# Patient Record
Sex: MALE | Race: WHITE | NOT HISPANIC OR LATINO | URBAN - METROPOLITAN AREA
[De-identification: names, ages, dates, MRNs, and addresses within clinical notes are randomized per-mention and may not be internally consistent; named-entity substitution may affect disease eponyms.]

---

## 2017-07-17 PROBLEM — 236060008 SYMPTOMATIC DISORDERS OF THE GASTROINTESTINAL TRACT: Status: ACTIVE | Noted: 2017-07-17

## 2017-07-17 PROBLEM — 422587007 NAUSEA: Status: INACTIVE | Noted: 2017-07-17

## 2017-07-17 PROBLEM — 110483000 TOBACCO USE: Status: INACTIVE | Noted: 2017-07-17

## 2019-08-21 PROBLEM — 266435005 GASTRO-ESOPHAGEAL REFLUX DISEASE WITHOUT ESOPHAGITIS: Status: ACTIVE | Noted: 2019-08-21

## 2019-08-21 PROBLEM — 449681000124101 LONG-TERM CURRENT USE OF ANTIPLATELET DRUG: Status: ACTIVE | Noted: 2019-08-21

## 2019-08-21 PROBLEM — 238131007 OVERWEIGHT: Status: ACTIVE | Noted: 2019-08-21

## 2019-08-21 PROBLEM — 428283002 HISTORY OF POLYP OF COLON (SITUATION): Status: ACTIVE | Noted: 2019-08-21

## 2019-08-21 PROBLEM — 87522002 IRON DEFICIENCY ANEMIA: Status: ACTIVE | Noted: 2019-08-21

## 2019-08-21 PROBLEM — 56294008 NICOTINE DEPENDENCE: Status: ACTIVE | Noted: 2019-08-21

## 2022-04-30 ENCOUNTER — TELEPHONE ENCOUNTER (OUTPATIENT)
Dept: URBAN - METROPOLITAN AREA CLINIC 121 | Facility: CLINIC | Age: 67
End: 2022-04-30

## 2022-04-30 RX ORDER — SIMVASTATIN 80 MG/1
QD TABLET, FILM COATED ORAL
OUTPATIENT
Start: 2015-01-05

## 2022-04-30 RX ORDER — SIMVASTATIN 80 MG/1
QD TABLET, FILM COATED ORAL
OUTPATIENT
Start: 2015-01-05 | End: 2019-08-21

## 2022-05-01 ENCOUNTER — TELEPHONE ENCOUNTER (OUTPATIENT)
Dept: URBAN - METROPOLITAN AREA CLINIC 121 | Facility: CLINIC | Age: 67
End: 2022-05-01

## 2022-05-01 RX ORDER — LANOLIN AND PETROLATUM .01; .97 G/G; G/G
OINTMENT TOPICAL
Status: ACTIVE | COMMUNITY
Start: 2019-08-21

## 2022-05-01 RX ORDER — ASPIRIN 81 MG/1
QD TABLET ORAL
Status: ACTIVE | COMMUNITY
Start: 2015-01-05

## 2024-08-12 ENCOUNTER — OFFICE VISIT (OUTPATIENT)
Dept: URBAN - METROPOLITAN AREA CLINIC 27 | Facility: CLINIC | Age: 69
End: 2024-08-12
Payer: COMMERCIAL

## 2024-08-12 ENCOUNTER — TELEPHONE ENCOUNTER (OUTPATIENT)
Dept: URBAN - METROPOLITAN AREA CLINIC 27 | Facility: CLINIC | Age: 69
End: 2024-08-12

## 2024-08-12 VITALS
SYSTOLIC BLOOD PRESSURE: 140 MMHG | HEIGHT: 69 IN | WEIGHT: 185 LBS | DIASTOLIC BLOOD PRESSURE: 86 MMHG | BODY MASS INDEX: 27.4 KG/M2 | HEART RATE: 75 BPM

## 2024-08-12 DIAGNOSIS — Z87.898 HISTORY OF ALCOHOL ABUSE: ICD-10-CM

## 2024-08-12 DIAGNOSIS — I10 HYPERTENSION: ICD-10-CM

## 2024-08-12 DIAGNOSIS — M54.50 LOWER BACK PAIN: ICD-10-CM

## 2024-08-12 DIAGNOSIS — R10.84 GENERALIZED ABDOMINAL DISCOMFORT: ICD-10-CM

## 2024-08-12 DIAGNOSIS — Z79.82 LONG TERM (CURRENT) USE OF ASPIRIN: ICD-10-CM

## 2024-08-12 DIAGNOSIS — Z86.010 HX OF ADENOMATOUS POLYP OF COLON: ICD-10-CM

## 2024-08-12 PROCEDURE — 99204 OFFICE O/P NEW MOD 45 MIN: CPT | Performed by: INTERNAL MEDICINE

## 2024-08-12 RX ORDER — LANOLIN AND PETROLATUM .01; .97 G/G; G/G
OINTMENT TOPICAL
Status: ACTIVE | COMMUNITY
Start: 2019-08-21

## 2024-08-12 RX ORDER — POLYETHYLENE GLYCOL 3350, SODIUM SULFATE ANHYDROUS, SODIUM BICARBONATE, SODIUM CHLORIDE, POTASSIUM CHLORIDE 236; 22.74; 6.74; 5.86; 2.97 G/4L; G/4L; G/4L; G/4L; G/4L
4000ML POWDER, FOR SOLUTION ORAL ONCE
Qty: 4000 MILLILITER | Refills: 0 | OUTPATIENT
Start: 2024-08-12 | End: 2024-08-13

## 2024-08-12 RX ORDER — ASPIRIN 81 MG/1
QD TABLET ORAL
Status: ACTIVE | COMMUNITY
Start: 2015-01-05

## 2024-08-12 NOTE — PHYSICAL EXAM GASTROINTESTINAL
Abdomen is soft, very mild diffuse TTP, nondistended, no guarding or rigidity, no masses palpable, normal bowel sounds

## 2024-08-12 NOTE — HPI-TODAY'S VISIT:
Patient here for repeat colonoscopy.  His last colonoscopy was in 2019; one adenoma was removed.  An adenomatous polyp was removed during a 2015 colonoscopy as well.  He currently has no GI symptoms aside from persistent lower lumbosacral spine pain that is only present in the morning, and will usually awaken him from sleep.  He denies abdominal pain but does have occasional mild diffuse discomfort.  He has not tried taking any medication for this. His chronic diarrhea has resolved since he stopped drinking alcohol 5 months ago. He underwent upper endoscopy in 2017 which revealed mild reflux esophagitis at the GEJ, as well as mild prepyloric antral erythema and mild erosive bulbar duodenum.  Duodenal biopsies revealed duodenitis but no H. pylori.  Biopsies from the GEJ revealed reflux esophagitis.  There is no family history of colon cancer or polyps. His father had pancreatic cancer. H. pylori breath testing in 2017 was negative. He has a CT (?body part) scheduled for next week. Recent labs were apparently unremarkable. He is a  and he sits for extended periods of time.

## 2024-08-13 ENCOUNTER — TELEPHONE ENCOUNTER (OUTPATIENT)
Dept: URBAN - METROPOLITAN AREA CLINIC 27 | Facility: CLINIC | Age: 69
End: 2024-08-13

## 2024-08-13 ENCOUNTER — DASHBOARD ENCOUNTERS (OUTPATIENT)
Age: 69
End: 2024-08-13

## 2024-08-13 ENCOUNTER — LAB OUTSIDE AN ENCOUNTER (OUTPATIENT)
Dept: URBAN - METROPOLITAN AREA CLINIC 27 | Facility: CLINIC | Age: 69
End: 2024-08-13

## 2024-08-23 ENCOUNTER — CLAIMS CREATED FROM THE CLAIM WINDOW (OUTPATIENT)
Dept: URBAN - METROPOLITAN AREA SURGERY CENTER 7 | Facility: SURGERY CENTER | Age: 69
End: 2024-08-23
Payer: COMMERCIAL

## 2024-08-23 ENCOUNTER — TELEPHONE ENCOUNTER (OUTPATIENT)
Dept: URBAN - METROPOLITAN AREA CLINIC 27 | Facility: CLINIC | Age: 69
End: 2024-08-23

## 2024-08-23 DIAGNOSIS — Z86.010 ADENOMAS PERSONAL HISTORY OF COLONIC POLYPS: ICD-10-CM

## 2024-08-23 DIAGNOSIS — Z09 ENCOUNTER FOR FOLLOW-UP: ICD-10-CM

## 2024-08-23 DIAGNOSIS — Z12.11 COLON CANCER SCREENING (HIGH RISK): ICD-10-CM

## 2024-08-23 DIAGNOSIS — K57.30 DIVERTICULA OF COLON: ICD-10-CM

## 2024-08-23 DIAGNOSIS — Z86.010 PERSONAL HISTORY OF COLON POLYPS: ICD-10-CM

## 2024-08-23 DIAGNOSIS — K62.89 HYPERTROPHIED ANAL PAPILLA: ICD-10-CM

## 2024-08-23 DIAGNOSIS — K64.8 HEMORRHOIDS, INTERNAL. NON-BLEEDING: ICD-10-CM

## 2024-08-23 PROCEDURE — G0105 COLORECTAL SCRN; HI RISK IND: HCPCS | Performed by: INTERNAL MEDICINE

## 2024-08-23 PROCEDURE — 0529F INTRVL 3/>YR PTS CLNSCP DOCD: CPT | Performed by: INTERNAL MEDICINE

## 2024-08-23 PROCEDURE — 00812 ANES LWR INTST SCR COLSC: CPT | Performed by: REGISTERED NURSE

## 2024-08-23 RX ORDER — HYOSCYAMINE SULFATE 0.12 MG/1
1 OR 2 TABLETS TABLET ORAL EVERY 6 HOURS
Qty: 90 | Refills: 3 | OUTPATIENT
Start: 2024-08-23 | End: 2024-12-20

## 2024-08-23 RX ORDER — LANOLIN AND PETROLATUM .01; .97 G/G; G/G
OINTMENT TOPICAL
Status: ACTIVE | COMMUNITY
Start: 2019-08-21

## 2024-08-23 RX ORDER — ASPIRIN 81 MG/1
QD TABLET ORAL
Status: ACTIVE | COMMUNITY
Start: 2015-01-05

## 2024-09-06 ENCOUNTER — OFFICE VISIT (OUTPATIENT)
Dept: URBAN - METROPOLITAN AREA SURGERY CENTER 7 | Facility: SURGERY CENTER | Age: 69
End: 2024-09-06

## 2025-02-17 ENCOUNTER — TELEPHONE ENCOUNTER (OUTPATIENT)
Dept: URBAN - METROPOLITAN AREA CLINIC 27 | Facility: CLINIC | Age: 70
End: 2025-02-17

## 2025-03-25 ENCOUNTER — LAB OUTSIDE AN ENCOUNTER (OUTPATIENT)
Dept: URBAN - METROPOLITAN AREA CLINIC 27 | Facility: CLINIC | Age: 70
End: 2025-03-25

## 2025-03-30 LAB
CALPROTECTIN, FECAL: 24
CAMPYLOBACTER SPP. AG,EIA: (no result)
CLOSTRIDIUM DIFFICILE TOXINB,QL REAL TIME PCR: NOT DETECTED
CLOSTRIDIUM DIFFICILE: (no result)
GIARDIA AG, EIA, STOOL: (no result)
LEUKOCYTES: (no result)
OVA AND PARASITES, CONC AND PERM SMEAR: (no result)
PANCREATIC ELASTASE, FECAL: >800
RESULT:: (no result)
SALMONELLA AND SHIGELLA, CULTURE: (no result)
SHIGA TOXINS, EIA W/RFL TO E.COLI O157 CULTURE: (no result)